# Patient Record
(demographics unavailable — no encounter records)

---

## 2025-04-09 NOTE — HISTORY OF PRESENT ILLNESS
[FreeTextEntry1] : 76 yo born in [facility], came in 1973 to go to Graduate school for dietitian.  WOrked at [facility], Pt here to establish care. Pt is , has 2 sons and 5 grand kids.  50 years.    Dr Rosenbaum - ophthalmologist No hosptializations in past year. Cardiologist - ablation for palpitations -   GI - Dr Lucia - colonscopy in 2024, to be repeated in 2029  Pt has no other complaints  [NO] : No [Little interest or pleasure doing things] : 1) Little interest or pleasure doing things [Feeling down, depressed, or hopeless] : 2) Feeling down, depressed, or hopeless [0] : 2) Feeling down, depressed, or hopeless: Not at all (0) [[id number]] : 0

## 2025-04-09 NOTE — ASSESSMENT
[FreeTextEntry1] : [facility] for sleep for past 20 years - need to consider titrating down, but pt has sleep issues. Discussed how to address sleep  will check labs, return in 1- 2months for congnitive evaluation, medication evaluation, further discussion re sleep

## 2025-04-09 NOTE — HISTORY OF PRESENT ILLNESS
[FreeTextEntry1] : 76 yo born in [facility] came in 1973 to go to Graduate school for dietitian.  WOrked at [facility], Pt here to establish care. Pt is , has 2 sons and 5 grand kids.  50 years.    Dr Rosenbaum - ophthalmologist No hosptializations in past year. Cardiologist - ablation for palpitations -   GI - Dr Lucia - colonscopy in 2024, to be repeated in 2029  Pt has no other complaints  [NO] : No [Little interest or pleasure doing things] : 1) Little interest or pleasure doing things [Feeling down, depressed, or hopeless] : 2) Feeling down, depressed, or hopeless [0] : 2) Feeling down, depressed, or hopeless: Not at all (0) [[id number]] : 0

## 2025-06-10 NOTE — ASSESSMENT
[FreeTextEntry1] : GYN followup discussed Bone density in a year Immunizations up to date   Ten minutes or more spent talking with pt about mood, sleep, appetite, motivation, anxiety in screening for depression.

## 2025-06-10 NOTE — HISTORY OF PRESENT ILLNESS
[Patient reported osteoporosis screening was abnormal] : Patient reported osteoporosis screening was abnormal [Patient reported hearing was normal] : Patient reported hearing was normal [Patient reported vision is normal] : Patient reported vision is normal [Patient reported dental screening is abnormal] : Patient reported dental screening is abnormal [Patient reported colon/rectal/cancer screening was normal] : Patient reported colon/rectal cancer screening was normal [Patient reported skin cancer screening was normal] : Patient reported skin cancer screening was normal [Patient reported breast cancer screening was normal] : Patient reported breast cancer screening was normal [One fall no injury in past year] : Patient reported one fall in the past year without injury [Completely Independent] : Completely independent. [NO] : No [Little interest or pleasure doing things] : 1) Little interest or pleasure doing things [Feeling down, depressed, or hopeless] : 2) Feeling down, depressed, or hopeless [0] : 2) Feeling down, depressed, or hopeless: Not at all (0) [PHQ-2 Negative - No further assessment needed] : PHQ-2 Negative - No further assessment needed [I have developed a follow-up plan documented below in the note.] : I have developed a follow-up plan documented below in the note. [FreeTextEntry1] : 76 yo female with htn, insomnia, h/o syncope who was taking benadryl for sleep i [facility] for followup. Pt has since stopped benadryl. Pt has tried melatonin for sleep, no other meds. Pt goes to bed around 11pm, wakes up twice to urinate. Pt does not feel she is sleeping well. Getting to sleep takes time and is a light sleeper.   Not sad, not anxious. No falls. No new syncopal episodes.  Pt had mammogram 6 months ago, bone density less than 2 years ago. Pt on pravastatin. Stopped raloxifene bc of dental implants. [FreeTextEntry6] : Due bc had abnl pap smear in past, no longer sexually active [de-identified] : None [de-identified] : No safety concerns identified.  [de-identified] : No safety concerns identified.  [[id number]] : 0